# Patient Record
Sex: FEMALE | Race: WHITE | NOT HISPANIC OR LATINO | Employment: UNEMPLOYED | URBAN - METROPOLITAN AREA
[De-identification: names, ages, dates, MRNs, and addresses within clinical notes are randomized per-mention and may not be internally consistent; named-entity substitution may affect disease eponyms.]

---

## 2020-06-10 PROBLEM — R56.9 SEIZURE: Status: ACTIVE | Noted: 2020-06-10

## 2020-06-10 PROBLEM — G43.009 MIGRAINE WITHOUT AURA AND WITHOUT STATUS MIGRAINOSUS, NOT INTRACTABLE: Status: ACTIVE | Noted: 2020-06-10

## 2020-11-25 PROBLEM — F41.1 GENERALIZED ANXIETY DISORDER: Status: ACTIVE | Noted: 2020-11-25

## 2021-03-18 PROBLEM — F32.A DEPRESSION: Status: ACTIVE | Noted: 2021-03-18

## 2021-07-29 PROBLEM — F33.1 MDD (MAJOR DEPRESSIVE DISORDER), RECURRENT EPISODE, MODERATE: Status: ACTIVE | Noted: 2021-03-18

## 2021-07-29 PROBLEM — F41.0 GENERALIZED ANXIETY DISORDER WITH PANIC ATTACKS: Status: ACTIVE | Noted: 2020-11-25

## 2022-02-23 PROBLEM — F33.0 MDD (MAJOR DEPRESSIVE DISORDER), RECURRENT EPISODE, MILD: Status: ACTIVE | Noted: 2021-03-18

## 2022-10-10 PROBLEM — F32.4 DEPRESSION, MAJOR, IN PARTIAL REMISSION: Status: ACTIVE | Noted: 2022-10-10

## 2022-10-10 PROBLEM — F33.0 MDD (MAJOR DEPRESSIVE DISORDER), RECURRENT EPISODE, MILD: Status: RESOLVED | Noted: 2021-03-18 | Resolved: 2022-10-10

## 2023-11-15 ENCOUNTER — SOCIAL WORK (OUTPATIENT)
Dept: ADMINISTRATIVE | Facility: OTHER | Age: 48
End: 2023-11-15

## 2023-11-15 NOTE — PROGRESS NOTES
Sw received a consult to assist with counseling. Sw called and spoke to Patient ([phone]). She is agreeable to counseling. Evangelista faxed a referral to [facility] to review.    Trice Johnson LCSW    [phone]

## 2023-11-17 ENCOUNTER — SOCIAL WORK (OUTPATIENT)
Dept: ADMINISTRATIVE | Facility: OTHER | Age: 48
End: 2023-11-17

## 2023-11-17 NOTE — PROGRESS NOTES
Evangelista received a message from Katiana at [facility]. She called Patient and left her a message to call back. Once scheduled, Sw will be notified.    [address]  [id number]    Trice Johnson LCSW    [phone]